# Patient Record
Sex: MALE | Race: OTHER | NOT HISPANIC OR LATINO | Employment: FULL TIME | URBAN - METROPOLITAN AREA
[De-identification: names, ages, dates, MRNs, and addresses within clinical notes are randomized per-mention and may not be internally consistent; named-entity substitution may affect disease eponyms.]

---

## 2020-12-12 ENCOUNTER — TRANSCRIBE ORDERS (OUTPATIENT)
Dept: URGENT CARE | Facility: CLINIC | Age: 60
End: 2020-12-12

## 2020-12-12 ENCOUNTER — APPOINTMENT (OUTPATIENT)
Dept: URGENT CARE | Facility: CLINIC | Age: 60
End: 2020-12-12

## 2020-12-12 DIAGNOSIS — Z00.00 PHYSICAL EXAM: ICD-10-CM

## 2020-12-12 DIAGNOSIS — Z00.00 PHYSICAL EXAM: Primary | ICD-10-CM

## 2020-12-12 PROCEDURE — 86765 RUBEOLA ANTIBODY: CPT | Performed by: FAMILY MEDICINE

## 2020-12-12 PROCEDURE — 86762 RUBELLA ANTIBODY: CPT | Performed by: FAMILY MEDICINE

## 2020-12-12 PROCEDURE — 86735 MUMPS ANTIBODY: CPT | Performed by: FAMILY MEDICINE

## 2020-12-12 PROCEDURE — 86787 VARICELLA-ZOSTER ANTIBODY: CPT | Performed by: FAMILY MEDICINE

## 2020-12-12 PROCEDURE — 86480 TB TEST CELL IMMUN MEASURE: CPT | Performed by: FAMILY MEDICINE

## 2020-12-13 LAB — RUBV IGG SERPL IA-ACNC: >175 IU/ML

## 2020-12-14 LAB — VZV IGG SER IA-ACNC: NORMAL

## 2020-12-15 LAB
GAMMA INTERFERON BACKGROUND BLD IA-ACNC: 0.06 IU/ML
M TB IFN-G BLD-IMP: NEGATIVE
M TB IFN-G CD4+ BCKGRND COR BLD-ACNC: -0.02 IU/ML
M TB IFN-G CD4+ BCKGRND COR BLD-ACNC: 0.02 IU/ML
MEV IGG SER QL: NORMAL
MITOGEN IGNF BCKGRD COR BLD-ACNC: >10 IU/ML
MUV IGG SER QL: NORMAL

## 2020-12-23 ENCOUNTER — IMMUNIZATIONS (OUTPATIENT)
Dept: FAMILY MEDICINE CLINIC | Facility: HOSPITAL | Age: 60
End: 2020-12-23
Payer: COMMERCIAL

## 2020-12-23 DIAGNOSIS — Z23 ENCOUNTER FOR IMMUNIZATION: ICD-10-CM

## 2020-12-23 PROCEDURE — 0011A SARS-COV-2 / COVID-19 MRNA VACCINE (MODERNA) 100 MCG: CPT

## 2020-12-23 PROCEDURE — 91301 SARS-COV-2 / COVID-19 MRNA VACCINE (MODERNA) 100 MCG: CPT

## 2021-01-19 ENCOUNTER — IMMUNIZATIONS (OUTPATIENT)
Dept: FAMILY MEDICINE CLINIC | Facility: HOSPITAL | Age: 61
End: 2021-01-19

## 2021-01-19 DIAGNOSIS — Z23 ENCOUNTER FOR IMMUNIZATION: Primary | ICD-10-CM

## 2021-01-19 PROCEDURE — 91301 SARS-COV-2 / COVID-19 MRNA VACCINE (MODERNA) 100 MCG: CPT

## 2021-01-19 PROCEDURE — 0012A SARS-COV-2 / COVID-19 MRNA VACCINE (MODERNA) 100 MCG: CPT

## 2021-05-07 ENCOUNTER — OFFICE VISIT (OUTPATIENT)
Dept: CARDIOLOGY CLINIC | Facility: CLINIC | Age: 61
End: 2021-05-07
Payer: COMMERCIAL

## 2021-05-07 VITALS
WEIGHT: 171 LBS | DIASTOLIC BLOOD PRESSURE: 80 MMHG | SYSTOLIC BLOOD PRESSURE: 130 MMHG | HEIGHT: 70 IN | TEMPERATURE: 97.7 F | BODY MASS INDEX: 24.48 KG/M2 | HEART RATE: 55 BPM | OXYGEN SATURATION: 99 %

## 2021-05-07 DIAGNOSIS — E78.2 MIXED HYPERLIPIDEMIA: ICD-10-CM

## 2021-05-07 DIAGNOSIS — I25.119 CORONARY ARTERY DISEASE INVOLVING NATIVE CORONARY ARTERY OF NATIVE HEART WITH ANGINA PECTORIS (HCC): ICD-10-CM

## 2021-05-07 DIAGNOSIS — R73.03 PRE-DIABETES: ICD-10-CM

## 2021-05-07 DIAGNOSIS — R06.02 SHORTNESS OF BREATH: ICD-10-CM

## 2021-05-07 DIAGNOSIS — Z82.49 FAMILY HISTORY OF ISCHEMIC HEART DISEASE (IHD): ICD-10-CM

## 2021-05-07 DIAGNOSIS — I10 ESSENTIAL HYPERTENSION: ICD-10-CM

## 2021-05-07 PROBLEM — E11.9 TYPE 2 DIABETES MELLITUS WITHOUT COMPLICATION, WITHOUT LONG-TERM CURRENT USE OF INSULIN (HCC): Status: ACTIVE | Noted: 2019-11-19

## 2021-05-07 PROCEDURE — 3008F BODY MASS INDEX DOCD: CPT | Performed by: INTERNAL MEDICINE

## 2021-05-07 PROCEDURE — 93000 ELECTROCARDIOGRAM COMPLETE: CPT | Performed by: INTERNAL MEDICINE

## 2021-05-07 PROCEDURE — 99244 OFF/OP CNSLTJ NEW/EST MOD 40: CPT | Performed by: INTERNAL MEDICINE

## 2021-05-07 RX ORDER — EZETIMIBE 10 MG/1
10 TABLET ORAL DAILY
COMMUNITY
Start: 2021-04-23

## 2021-05-07 RX ORDER — LISINOPRIL 20 MG/1
20 TABLET ORAL 2 TIMES DAILY
COMMUNITY
Start: 2021-04-23

## 2021-05-07 RX ORDER — MULTIVITAMIN
TABLET ORAL DAILY
COMMUNITY

## 2021-05-07 RX ORDER — AMLODIPINE BESYLATE 2.5 MG/1
2.5 TABLET ORAL 2 TIMES DAILY
COMMUNITY
Start: 2021-04-23

## 2021-05-07 RX ORDER — ASPIRIN 325 MG
325 TABLET ORAL DAILY
COMMUNITY

## 2021-05-07 RX ORDER — ROSUVASTATIN CALCIUM 40 MG/1
40 TABLET, COATED ORAL DAILY
COMMUNITY
Start: 2021-04-23

## 2021-05-07 NOTE — PROGRESS NOTES
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 he has more shortness of breath when he exerts  He had history of coronary artery disease with ectasia of LAD on last catheterization in   His previously EKG stress test was abnormal   There is no point of repeating exercise stress test he will be scheduled for nuclear stress test       2  Coronary artery disease  He had history of coronary artery disease with moderate stenosis of LAD  We do not have the report and films available  I have seen his angiogram in 2012  Continue aspirin continue statins and Zetia  Cholesterol profile is acceptable  goal is LDL less than 70      3  Dyslipidemia  Currently he is on Crestor 40 mg and Zetia 10 mg  He is tolerating it very well  Is scheduled to have repeat blood test his LDL goal is generally 60-70       4  Family history of ischemic heart disease  Family members have history of coronary artery disease  He is well aware of pathophysiology of coronary artery disease  5  Pre diabetes mellitus  HbA1c is around 6  He is on metformin  6  Essential hypertension  Blood pressure is well controlled with amlodipine 2 5 twice a day and lisinopril  Electrolytes are acceptable  Continue same Rx  Continue current Rx    Due to shortness of breath will schedule for echo Doppler as well as nuclear stress test as he has previously abnormal stress EKG test   His previous reports were reviewed  Thank you for your consultation  If you have any question please call me at [phone]    Counseling :  A description of the counseling  Goals and Barriers  Patient's ability to self care: Yes  Medication side effect reviewed with patient in detail and all their questions answered to their satisfaction  Primary Care Physician Requesting Consult: Self referred    Reason for Consult / Principal Problem:  Exertional shortness of breath and history of coronary artery disease  HPI :     Elyssa Amor is a 57y o  year old male who was referred by primary care doctor for  Exertional shortness of breath  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  Has personal history of hyperlipidemia, nonobstructive coronary artery disease by cardiac catheterization in about 2013 at [facility] now noted that he has some exertional shortness of breath  His other risk factors include history of pre diabetes mellitus, dyslipidemia  Previously he used to be on niacin and Crestor currently he is taking Zetia and niacin and cholesterol profile acceptable  He does not smoke  He is generally very fit but 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 particularly when he climbs stairs  No chest pain EKG done shows normal sinus rhythm heart rate 55 beats per minute  Previously he has done exercise stress test which has come abnormal   I have seen his previous angiogram in 2013 who has report is not available it shows he has around 35 40% lad mid stenosis with ectasia of LAD was noted  His last stress EKG test was in 2015  But his 1st EKG stressed in 2008 as well as in 2010 has shown ST depression of 1-2 mm which led to cardiac catheterization  Later on nuclear stress test was nonischemic at that time       He does not smoke     no recent surgery    Review of Systems   Constitutional: Negative for activity change, chills, diaphoresis, fever and unexpected weight change  HENT: Negative for congestion  Eyes: Negative for discharge and redness  Respiratory: Positive for shortness of breath  Negative for cough, chest tightness and wheezing  Exertional   Cardiovascular: Negative  Negative for chest pain, palpitations and leg swelling  Gastrointestinal: Negative for abdominal pain, diarrhea and nausea  Endocrine: Negative  Genitourinary: Negative for decreased urine volume and urgency  Musculoskeletal: Negative  Negative for arthralgias, back pain and gait problem  Skin: Negative for rash and wound  Allergic/Immunologic: Negative  Neurological: Negative for dizziness, seizures, syncope, weakness, light-headedness and headaches  Hematological: Negative  Psychiatric/Behavioral: Negative for agitation and confusion  The patient is not nervous/anxious  Historical Information   Past Medical History:   Diagnosis Date    HTN (hypertension)     Hyperlipidemia     Prediabetes      History reviewed  No pertinent surgical history    Social History     Substance and Sexual Activity   Alcohol Use Yes    Comment: on weekends     Social History     Substance and Sexual Activity   Drug Use Not on file     Social History     Tobacco Use   Smoking Status Never Smoker   Smokeless Tobacco Never Used     Family History:   Family History   Problem Relation Age of Onset    Coronary artery disease Maternal Aunt     Coronary artery disease Maternal Uncle        Meds/Allergies     No Known Allergies    Current Outpatient Medications:     amLODIPine (NORVASC) 2 5 mg tablet, Take 2 5 mg by mouth 2 (two) times a day, Disp: , Rfl:     aspirin 325 mg tablet, Take 325 mg by mouth daily, Disp: , Rfl:     ezetimibe (ZETIA) 10 mg tablet, Take 10 mg by mouth daily, Disp: , Rfl:     lisinopril (ZESTRIL) 20 mg tablet, Take 20 mg by mouth 2 (two) times a day, Disp: , Rfl:     METFORMIN HCL ER PO, Take 1,500 mg by mouth daily, Disp: , Rfl:     Multiple Vitamin (Multi Vitamin Mens) tablet, Take by mouth daily, Disp: , Rfl:     rosuvastatin (CRESTOR) 40 MG tablet, Take 40 mg by mouth daily, Disp: , Rfl:     Vitals: Blood pressure 130/80, pulse 55, temperature 97 7 °F (36 5 °C), height 5' 9 5" (1 765 m), weight 77 6 kg (171 lb), SpO2 99 %  Body mass index is 24 89 kg/m²  Wt Readings from Last 3 Encounters:   05/07/21 77 6 kg (171 lb)     Vitals:    05/07/21 1501   Weight: 77 6 kg (171 lb)     BP Readings from Last 3 Encounters:   05/07/21 130/80         Physical Exam    Neurologic:  Alert & oriented x 3, no new focal deficits, Not in any acute distress,  Constitutional:  Well developed, well nourished, non-toxic appearance   Eyes:  Pupil equal and reacting to light, conjunctiva normal, No JVP, No LNP , no carotid bruit  HENT:  Atraumatic, oropharynx moist, Neck- normal range of motion, no tenderness,  Neck supple   Respiratory:  Bilateral air entry, mostly clear to auscultation  Cardiovascular: S1-S2 regular with  No gallops or murmur  GI:  Soft, nondistended, normal bowel sounds, nontender, no hepatosplenomegaly appreciated  Musculoskeletal:  No edema, no tenderness, no deformities  Skin:  Well hydrated, no rash   Lymphatic:  No lymphadenopathy noted   Extremities:  No edema and distal pulses are present    Diagnostic Studies Review Cardio:     exercise stress test done 12/04/2088 as well as in 2009 shows positive stress EKG test but nuclear stress test shows no ischemia     cardiac catheterization done in 2012 or 2013 shows nonobstructive disease with ectasia of LAD  Nonobstructive disease in other arteries  EKG:    Twelve lead EKG done today 05/07/2021 shows sinus rhythm heart rate 55 beats per minute  No significant ST changes  Dr Suman Kuhn MD [facility] [place]      "This note has been constructed using a voice recognition system  Therefore there may be syntax, spelling, and/or grammatical errors   Please call if you have any questions  "

## 2021-05-12 ENCOUNTER — HOSPITAL ENCOUNTER (OUTPATIENT)
Dept: RADIOLOGY | Facility: HOSPITAL | Age: 61
Discharge: HOME | End: 2021-05-12
Attending: INTERNAL MEDICINE
Payer: COMMERCIAL

## 2021-05-12 ENCOUNTER — HOSPITAL ENCOUNTER (OUTPATIENT)
Dept: NON INVASIVE DIAGNOSTICS | Facility: HOSPITAL | Age: 61
Discharge: HOME | End: 2021-05-12
Attending: INTERNAL MEDICINE
Payer: COMMERCIAL

## 2021-05-12 DIAGNOSIS — I25.119 CORONARY ARTERY DISEASE INVOLVING NATIVE CORONARY ARTERY OF NATIVE HEART WITH ANGINA PECTORIS (HCC): ICD-10-CM

## 2021-05-12 DIAGNOSIS — E78.2 MIXED HYPERLIPIDEMIA: ICD-10-CM

## 2021-05-12 DIAGNOSIS — Z82.49 FAMILY HISTORY OF ISCHEMIC HEART DISEASE (IHD): ICD-10-CM

## 2021-05-12 DIAGNOSIS — R06.02 SHORTNESS OF BREATH: ICD-10-CM

## 2021-05-12 DIAGNOSIS — R73.03 PRE-DIABETES: ICD-10-CM

## 2021-05-12 LAB
CHEST PAIN STATEMENT: NORMAL
MAX DIASTOLIC BP: 90 MMHG
MAX HEART RATE: 166 BPM
MAX PREDICTED HEART RATE: 158 BPM
MAX. SYSTOLIC BP: 190 MMHG
PROTOCOL NAME: NORMAL
REASON FOR TERMINATION: NORMAL
TARGET HR FORMULA: NORMAL
TEST INDICATION: NORMAL
TIME IN EXERCISE PHASE: NORMAL

## 2021-05-12 PROCEDURE — 93306 TTE W/DOPPLER COMPLETE: CPT | Performed by: INTERNAL MEDICINE

## 2021-05-12 PROCEDURE — 93306 TTE W/DOPPLER COMPLETE: CPT

## 2021-05-12 PROCEDURE — G1004 CDSM NDSC: HCPCS

## 2021-05-12 PROCEDURE — A9502 TC99M TETROFOSMIN: HCPCS

## 2021-05-12 PROCEDURE — 93017 CV STRESS TEST TRACING ONLY: CPT

## 2021-05-12 PROCEDURE — 78452 HT MUSCLE IMAGE SPECT MULT: CPT

## 2021-05-13 PROCEDURE — 78452 HT MUSCLE IMAGE SPECT MULT: CPT | Performed by: INTERNAL MEDICINE

## 2021-05-13 PROCEDURE — 93018 CV STRESS TEST I&R ONLY: CPT | Performed by: INTERNAL MEDICINE

## 2021-05-13 PROCEDURE — 93016 CV STRESS TEST SUPVJ ONLY: CPT | Performed by: INTERNAL MEDICINE

## 2021-11-12 ENCOUNTER — IMMUNIZATIONS (OUTPATIENT)
Dept: FAMILY MEDICINE CLINIC | Facility: HOSPITAL | Age: 61
End: 2021-11-12

## 2021-11-12 DIAGNOSIS — Z23 ENCOUNTER FOR IMMUNIZATION: Primary | ICD-10-CM

## 2021-11-12 PROCEDURE — 0013A COVID-19 MODERNA VACC 0.25 ML BOOSTER: CPT

## 2021-11-12 PROCEDURE — 91306 COVID-19 MODERNA VACC 0.25 ML BOOSTER: CPT

## 2022-10-03 ENCOUNTER — PREP FOR PROCEDURE (OUTPATIENT)
Dept: GASTROENTEROLOGY | Facility: CLINIC | Age: 62
End: 2022-10-03

## 2022-10-03 DIAGNOSIS — K21.9 GASTROESOPHAGEAL REFLUX DISEASE WITHOUT ESOPHAGITIS: Primary | ICD-10-CM

## 2022-10-03 DIAGNOSIS — Z12.11 ENCOUNTER FOR SCREENING COLONOSCOPY: ICD-10-CM

## 2022-10-06 ENCOUNTER — ANESTHESIA EVENT (OUTPATIENT)
Dept: ANESTHESIOLOGY | Facility: AMBULATORY SURGERY CENTER | Age: 62
End: 2022-10-06

## 2022-10-06 ENCOUNTER — ANESTHESIA (OUTPATIENT)
Dept: ANESTHESIOLOGY | Facility: AMBULATORY SURGERY CENTER | Age: 62
End: 2022-10-06

## 2022-10-20 ENCOUNTER — HOSPITAL ENCOUNTER (OUTPATIENT)
Dept: GASTROENTEROLOGY | Facility: AMBULATORY SURGERY CENTER | Age: 62
Discharge: HOME | End: 2022-10-20

## 2022-10-20 ENCOUNTER — ANESTHESIA EVENT (OUTPATIENT)
Dept: GASTROENTEROLOGY | Facility: AMBULATORY SURGERY CENTER | Age: 62
End: 2022-10-20

## 2022-10-20 ENCOUNTER — ANESTHESIA (OUTPATIENT)
Dept: GASTROENTEROLOGY | Facility: AMBULATORY SURGERY CENTER | Age: 62
End: 2022-10-20

## 2022-10-20 VITALS
TEMPERATURE: 96.1 F | WEIGHT: 172 LBS | HEIGHT: 70 IN | RESPIRATION RATE: 18 BRPM | HEART RATE: 72 BPM | BODY MASS INDEX: 24.62 KG/M2 | DIASTOLIC BLOOD PRESSURE: 72 MMHG | SYSTOLIC BLOOD PRESSURE: 118 MMHG | OXYGEN SATURATION: 98 %

## 2022-10-20 DIAGNOSIS — K21.9 GASTROESOPHAGEAL REFLUX DISEASE WITHOUT ESOPHAGITIS: ICD-10-CM

## 2022-10-20 DIAGNOSIS — Z12.11 ENCOUNTER FOR SCREENING COLONOSCOPY: ICD-10-CM

## 2022-10-20 DIAGNOSIS — K25.3 ACUTE GASTRIC ULCER WITHOUT HEMORRHAGE OR PERFORATION: Primary | ICD-10-CM

## 2022-10-20 PROCEDURE — 88342 IMHCHEM/IMCYTCHM 1ST ANTB: CPT | Performed by: STUDENT IN AN ORGANIZED HEALTH CARE EDUCATION/TRAINING PROGRAM

## 2022-10-20 PROCEDURE — 88341 IMHCHEM/IMCYTCHM EA ADD ANTB: CPT | Performed by: STUDENT IN AN ORGANIZED HEALTH CARE EDUCATION/TRAINING PROGRAM

## 2022-10-20 PROCEDURE — 88313 SPECIAL STAINS GROUP 2: CPT | Performed by: STUDENT IN AN ORGANIZED HEALTH CARE EDUCATION/TRAINING PROGRAM

## 2022-10-20 PROCEDURE — 88305 TISSUE EXAM BY PATHOLOGIST: CPT | Performed by: STUDENT IN AN ORGANIZED HEALTH CARE EDUCATION/TRAINING PROGRAM

## 2022-10-20 RX ORDER — SODIUM CHLORIDE, SODIUM LACTATE, POTASSIUM CHLORIDE, CALCIUM CHLORIDE 600; 310; 30; 20 MG/100ML; MG/100ML; MG/100ML; MG/100ML
INJECTION, SOLUTION INTRAVENOUS CONTINUOUS PRN
Status: DISCONTINUED | OUTPATIENT
Start: 2022-10-20 | End: 2022-10-20

## 2022-10-20 RX ORDER — PANTOPRAZOLE SODIUM 40 MG/1
40 TABLET, DELAYED RELEASE ORAL DAILY
Qty: 30 TABLET | Refills: 1 | Status: SHIPPED | OUTPATIENT
Start: 2022-10-20

## 2022-10-20 RX ORDER — PROPOFOL 10 MG/ML
INJECTION, EMULSION INTRAVENOUS AS NEEDED
Status: DISCONTINUED | OUTPATIENT
Start: 2022-10-20 | End: 2022-10-20

## 2022-10-20 RX ADMIN — PROPOFOL 150 MG: 10 INJECTION, EMULSION INTRAVENOUS at 07:30

## 2022-10-20 RX ADMIN — PROPOFOL 20 MG: 10 INJECTION, EMULSION INTRAVENOUS at 07:32

## 2022-10-20 RX ADMIN — PROPOFOL 30 MG: 10 INJECTION, EMULSION INTRAVENOUS at 07:40

## 2022-10-20 RX ADMIN — PROPOFOL 20 MG: 10 INJECTION, EMULSION INTRAVENOUS at 07:45

## 2022-10-20 RX ADMIN — PROPOFOL 20 MG: 10 INJECTION, EMULSION INTRAVENOUS at 07:42

## 2022-10-20 RX ADMIN — PROPOFOL 30 MG: 10 INJECTION, EMULSION INTRAVENOUS at 07:34

## 2022-10-20 RX ADMIN — PROPOFOL 20 MG: 10 INJECTION, EMULSION INTRAVENOUS at 07:38

## 2022-10-20 RX ADMIN — SODIUM CHLORIDE, SODIUM LACTATE, POTASSIUM CHLORIDE, CALCIUM CHLORIDE: 600; 310; 30; 20 INJECTION, SOLUTION INTRAVENOUS at 07:27

## 2022-10-20 RX ADMIN — PROPOFOL 20 MG: 10 INJECTION, EMULSION INTRAVENOUS at 07:36

## 2022-10-20 NOTE — ANESTHESIA PREPROCEDURE EVALUATION
Procedure:  COLONOSCOPY  EGD    Relevant Problems   CARDIO   (+) CAD (coronary artery disease), native coronary artery   (+) Hyperlipidemia      ENDO   (+) Type 2 diabetes mellitus without complication, without long-term current use of insulin (HCC)        Physical Exam    Airway    Mallampati score: II  TM Distance: >3 FB  Neck ROM: full     Dental   No notable dental hx     Cardiovascular  Cardiovascular exam normal    Pulmonary  Pulmonary exam normal     Other Findings        Anesthesia Plan  ASA Score- 2     Anesthesia Type- IV sedation with anesthesia with ASA Monitors  Additional Monitors:   Airway Plan:           Plan Factors-Exercise tolerance (METS): >4 METS  Chart reviewed  EKG reviewed  Imaging results reviewed  Existing labs reviewed  Patient summary reviewed  Patient is not a current smoker  Induction-     Postoperative Plan-     Informed Consent- Anesthetic plan and risks discussed with patient  I personally reviewed this patient with the CRNA  Discussed and agreed on the Anesthesia Plan with the CRNA  Madhuri Cool

## 2022-10-20 NOTE — H&P
History and Physical -  [facility]  Jesus Gonzalez 60 y o  male MRN: [id number]                  HPI: Jesus Gonzalez is a 60y o  year old male who presents for for GERD and screening colonoscopy, last colonoscopy on 2012      REVIEW OF SYSTEMS: Per the HPI, and otherwise unremarkable  Historical Information   Past Medical History:   Diagnosis Date   • Diabetes mellitus ([address] )     pre dioabetic   • GERD (gastroesophageal reflux disease)    • HTN (hypertension)    • Hyperlipidemia    • Prediabetes      Past Surgical History:   Procedure Laterality Date   • COLONOSCOPY     • UPPER GASTROINTESTINAL ENDOSCOPY       Social History   Social History     Substance and Sexual Activity   Alcohol Use Yes    Comment: on weekends     Social History     Substance and Sexual Activity   Drug Use Never     Social History     Tobacco Use   Smoking Status Never Smoker   Smokeless Tobacco Never Used     Family History   Problem Relation Age of Onset   • Coronary artery disease Maternal Aunt    • Coronary artery disease Maternal Uncle        Meds/Allergies     (Not in a hospital admission)      No Known Allergies    Objective     /73   Pulse 63   Temp (!) 96 1 °F (35 6 °C) (Skin)   Resp 18   Ht 5' 10" (1 778 m)   Wt 78 kg (172 lb)   SpO2 100%   BMI 24 68 kg/m²       PHYSICAL EXAM    Gen: NAD  CV: RRR  CHEST: Clear  ABD: soft, NT/ND  EXT: no edema      ASSESSMENT/PLAN:  This is a 60y o  year old male here for EGD and colonoscopy, and he is stable and optimized for his procedure 
no strength deficits were identified

## 2022-10-26 NOTE — RESULT ENCOUNTER NOTE
I called and notified Dr Kelsey Sepulveda of Colonoscopy results  Placed patient on 10 year colon recall

## 2023-07-24 ENCOUNTER — TELEPHONE (OUTPATIENT)
Dept: CARDIOLOGY CLINIC | Facility: CLINIC | Age: 63
End: 2023-07-24

## 2023-07-24 ENCOUNTER — OFFICE VISIT (OUTPATIENT)
Dept: CARDIOLOGY CLINIC | Facility: CLINIC | Age: 63
End: 2023-07-24
Payer: COMMERCIAL

## 2023-07-24 ENCOUNTER — APPOINTMENT (OUTPATIENT)
Dept: LAB | Facility: HOSPITAL | Age: 63
End: 2023-07-24
Attending: INTERNAL MEDICINE
Payer: COMMERCIAL

## 2023-07-24 VITALS
HEIGHT: 70 IN | HEART RATE: 65 BPM | SYSTOLIC BLOOD PRESSURE: 138 MMHG | DIASTOLIC BLOOD PRESSURE: 80 MMHG | BODY MASS INDEX: 24.34 KG/M2 | OXYGEN SATURATION: 98 % | WEIGHT: 170 LBS

## 2023-07-24 DIAGNOSIS — R06.09 DYSPNEA ON EXERTION: ICD-10-CM

## 2023-07-24 DIAGNOSIS — R94.39 ABNORMAL STRESS TEST: ICD-10-CM

## 2023-07-24 DIAGNOSIS — R73.01 IMPAIRED FASTING BLOOD SUGAR: ICD-10-CM

## 2023-07-24 DIAGNOSIS — E78.2 MIXED HYPERLIPIDEMIA: ICD-10-CM

## 2023-07-24 DIAGNOSIS — R07.89 OTHER CHEST PAIN: ICD-10-CM

## 2023-07-24 DIAGNOSIS — Z82.49 FAMILY HISTORY OF ISCHEMIC HEART DISEASE (IHD): ICD-10-CM

## 2023-07-24 LAB — CARDIAC TROPONIN I PNL SERPL HS: 2 NG/L (ref 8–18)

## 2023-07-24 PROCEDURE — 99214 OFFICE O/P EST MOD 30 MIN: CPT | Performed by: INTERNAL MEDICINE

## 2023-07-24 PROCEDURE — 93000 ELECTROCARDIOGRAM COMPLETE: CPT | Performed by: INTERNAL MEDICINE

## 2023-07-24 PROCEDURE — 84484 ASSAY OF TROPONIN QUANT: CPT

## 2023-07-24 PROCEDURE — 36415 COLL VENOUS BLD VENIPUNCTURE: CPT

## 2023-07-24 RX ORDER — METFORMIN HYDROCHLORIDE 500 MG/1
1500 TABLET, EXTENDED RELEASE ORAL
COMMUNITY
Start: 2023-07-06

## 2023-07-24 NOTE — TELEPHONE ENCOUNTER
Patient has [facility] and is scheduled for nuclear stress test tomorrow 7/25/23.  Ordered by Dr. Nayeli Arteaga

## 2023-07-24 NOTE — PROGRESS NOTES
Progress note- Cardiology Office  [facility]. Yaneli Kruegertal 58 y.o. male MRN: [id number]  : 1960  Unit/Bed#:  Encounter: [id number]      Assessment:     1. Dyspnea on exertion    2. Other chest pain    3. Family history of ischemic heart disease (IHD)    4. Mixed hyperlipidemia    5. Abnormal stress test    6. Impaired fasting blood sugar        Discussion summary and Plan:      1. Exertional shortness of breath. Patient is having exertional shortness of breath. 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 He had history of coronary artery disease with ectasia of LAD on last catheterization in . His previous EKG test was abnormal.  He has recurrent of symptoms. EKG shows ST-T inferior leads he will be scheduled for nuclear stress test we will also check troponin stat. 2. Coronary artery disease. He had history of coronary artery disease with moderate stenosis of LAD. We do not have the report and films available. I have seen his angiogram in 2012. Continue aspirin continue statins and Zetia. Cholesterol profile is acceptable. goal is LDL less than 70.   Current Outpatient Medications:   •  amLODIPine (NORVASC) 2.5 mg tablet, Take 2.5 mg by mouth 2 (two) times a day, Disp: , Rfl:   •  aspirin 325 mg tablet, Take 325 mg by mouth daily, Disp: , Rfl:   •  ezetimibe (ZETIA) 10 mg tablet, Take 10 mg by mouth daily, Disp: , Rfl:   •  lisinopril (ZESTRIL) 20 mg tablet, Take 20 mg by mouth 2 (two) times a day, Disp: , Rfl:   •  metFORMIN (GLUCOPHAGE-XR) 500 mg 24 hr tablet, Take 1,500 mg by mouth daily at bedtime, Disp: , Rfl:   •  METFORMIN HCL ER PO, Take 1,500 mg by mouth daily, Disp: , Rfl:   •  Multiple Vitamin (Multi Vitamin Mens) tablet, Take by mouth daily, Disp: , Rfl:   •  rosuvastatin (CRESTOR) 40 MG tablet, Take 40 mg by mouth daily, Disp: , Rfl:   •  pantoprazole (PROTONIX) 40 mg tablet, Take 1 tablet (40 mg total) by mouth daily (Patient not taking: Reported on 7/24/2023), Disp: 30 tablet, Rfl: 1       3. Dyslipidemia. Currently he is on Crestor 40 mg and Zetia 10 mg. He is tolerating it very well. Is scheduled to have repeat blood test his LDL goal is generally 60-70. Rest of profile has been acceptable      4. Family history of ischemic heart disease. Family members have history of coronary artery disease. He is well aware of pathophysiology of coronary artery disease. 5. Pre diabetes mellitus. Severe A1c 5.9.      6. Essential hypertension. Blood pressure is well controlled with amlodipine 2.5 twice a day and lisinopril. Electrolytes are acceptable. Blood pressure borderline today. Continue current Rx. Due to episodes of chest pain, abnormal EKG, previous history of normal exercise stress test and new symptoms he will be scheduled for nuclear stress test urgently and stat troponin will be ordered. Thank you for your consultation. If you have any question please call me at [phone]    Counseling :  A description of the counseling. Goals and Barriers. Patient's ability to self care: Yes  Medication side effect reviewed with patient in detail and all their questions answered to their satisfaction. Primary Care Physician Requesting Consult: Self referred    Reason for Consult / Principal Problem: Exertional shortness of breath and chest pain    HPI :     Kashif García is a 58y.o. year old male who was referred by primary care doctor for  Exertional shortness of breath. 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 Has personal history of hyperlipidemia, nonobstructive coronary artery disease by cardiac catheterization in about 2013 at [facility] now noted that he has some exertional shortness of breath. His other risk factors include history of pre diabetes mellitus, dyslipidemia.   Previously he used to be on niacin and Crestor currently he is taking Zetia and niacin and cholesterol profile acceptable. He does not smoke. 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 No chest pain EKG done shows normal sinus rhythm heart rate 55 beats per minute. Previously he has done exercise stress test which has come abnormal.  I have seen his previous angiogram in 2013 who has report is not available it shows he has around 35 40% lad mid stenosis with ectasia of LAD was noted. His last stress EKG test was in 2015. But his 1st EKG stressed in 2008 as well as in 2010 has shown ST depression of 1-2 mm which led to cardiac catheterization. Later on nuclear stress test was nonischemic at that time. He does not smoke     no recent surgery    7/24/2023. 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 This was second episode since yesterday. Pain was initially on the right side and on the left precordial area. He also felt a little dyspnea on exertion. He had a history of nonobstructive coronary artery disease by cath many years ago. And previous test was minimally abnormal.  His EKG showed ST changes in inferior leads. He is currently not having any chest pain. No nausea no vomiting no other cardiovascular issues. Review of Systems   Constitutional: Negative for activity change, chills, diaphoresis, fever and unexpected weight change. HENT: Negative for congestion. Eyes: Negative for discharge and redness. Respiratory: Positive for shortness of breath. Negative for cough, chest tightness and wheezing. Cardiovascular: Positive for chest pain. Negative for palpitations and leg swelling. Gastrointestinal: Negative for abdominal pain, diarrhea and nausea. Endocrine: Negative. Genitourinary: Negative for decreased urine volume and urgency. Musculoskeletal: Negative.   Negative for arthralgias, back pain and gait problem. Skin: Negative for rash and wound. Allergic/Immunologic: Negative. Neurological: Negative for dizziness, seizures, syncope, weakness, light-headedness and headaches. Hematological: Negative. Psychiatric/Behavioral: Negative for agitation and confusion. The patient is nervous/anxious.         Historical Information   Past Medical History:   Diagnosis Date   • Diabetes mellitus ([address])     pre dioabetic   • GERD (gastroesophageal reflux disease)    • HTN (hypertension)    • Hyperlipidemia    • Prediabetes      Past Surgical History:   Procedure Laterality Date   • COLONOSCOPY     • UPPER GASTROINTESTINAL ENDOSCOPY       Social History     Substance and Sexual Activity   Alcohol Use Yes    Comment: on weekends     Social History     Substance and Sexual Activity   Drug Use Never     Social History     Tobacco Use   Smoking Status Never   Smokeless Tobacco Never     Family History:   Family History   Problem Relation Age of Onset   • Coronary artery disease Maternal Aunt    • Coronary artery disease Maternal Uncle        Meds/Allergies     No Known Allergies    Current Outpatient Medications:   •  amLODIPine (NORVASC) 2.5 mg tablet, Take 2.5 mg by mouth 2 (two) times a day, Disp: , Rfl:   •  aspirin 325 mg tablet, Take 325 mg by mouth daily, Disp: , Rfl:   •  ezetimibe (ZETIA) 10 mg tablet, Take 10 mg by mouth daily, Disp: , Rfl:   •  lisinopril (ZESTRIL) 20 mg tablet, Take 20 mg by mouth 2 (two) times a day, Disp: , Rfl:   •  metFORMIN (GLUCOPHAGE-XR) 500 mg 24 hr tablet, Take 1,500 mg by mouth daily at bedtime, Disp: , Rfl:   •  METFORMIN HCL ER PO, Take 1,500 mg by mouth daily, Disp: , Rfl:   •  Multiple Vitamin (Multi Vitamin Mens) tablet, Take by mouth daily, Disp: , Rfl:   •  rosuvastatin (CRESTOR) 40 MG tablet, Take 40 mg by mouth daily, Disp: , Rfl:   •  pantoprazole (PROTONIX) 40 mg tablet, Take 1 tablet (40 mg total) by mouth daily (Patient not taking: Reported on 7/24/2023), Disp: 30 tablet, Rfl: 1    Vitals: Blood pressure 138/80, pulse 65, height 5' 10" (1.778 m), weight 77.1 kg (170 lb), SpO2 98 %. ?  Body mass index is 24.39 kg/m². Wt Readings from Last 3 Encounters:   07/24/23 77.1 kg (170 lb)   10/20/22 78 kg (172 lb)   05/07/21 77.6 kg (171 lb)     Vitals:    07/24/23 1428   Weight: 77.1 kg (170 lb)     BP Readings from Last 3 Encounters:   07/24/23 138/80   10/20/22 118/72   05/07/21 130/80         Physical Exam  Constitutional:       General: He is not in acute distress. Appearance: He is well-developed. He is not diaphoretic. Neck:      Thyroid: No thyromegaly. Vascular: No JVD. Cardiovascular:      Rate and Rhythm: Normal rate and regular rhythm. Pulses: Normal pulses. Heart sounds: Normal heart sounds. Pulmonary:      Effort: Pulmonary effort is normal. No respiratory distress. Breath sounds: Normal breath sounds. No wheezing or rales. Abdominal:      General: There is no distension. Palpations: Abdomen is soft. Tenderness: There is no abdominal tenderness. There is no rebound. Musculoskeletal:         General: No tenderness. Normal range of motion. Cervical back: Normal range of motion and neck supple. Skin:     General: Skin is warm and dry. Neurological:      Mental Status: He is alert and oriented to person, place, and time. Psychiatric:         Behavior: Behavior normal.         Judgment: Judgment normal.           Diagnostic Studies Review Cardio:     exercise stress test done 12/04/2088 as well as in 2009 shows positive stress EKG test but nuclear stress test shows no ischemia     cardiac catheterization done in 2012 or 2013 shows nonobstructive disease with ectasia of LAD. Nonobstructive disease in other arteries. EKG:    Twelve lead EKG done today 05/07/2021 shows sinus rhythm heart rate 55 beats per minute. No significant ST changes.     Twelve-lead EKG done on 7/24/2023 shows normal sinus some ST abnormality in inferior lateral leads cannot rule out ischemia. Dr. Nathalie Doyle MD [facility] [place]      "This note has been constructed using a voice recognition system. Therefore there may be syntax, spelling, and/or grammatical errors.  Please call if you have any questions. "

## 2023-07-25 ENCOUNTER — HOSPITAL ENCOUNTER (OUTPATIENT)
Dept: NON INVASIVE DIAGNOSTICS | Facility: HOSPITAL | Age: 63
Discharge: HOME | End: 2023-07-25

## 2023-07-27 ENCOUNTER — HOSPITAL ENCOUNTER (OUTPATIENT)
Dept: RADIOLOGY | Facility: HOSPITAL | Age: 63
Discharge: HOME | End: 2023-07-27
Payer: COMMERCIAL

## 2023-07-27 ENCOUNTER — HOSPITAL ENCOUNTER (OUTPATIENT)
Dept: NON INVASIVE DIAGNOSTICS | Facility: HOSPITAL | Age: 63
Discharge: HOME | End: 2023-07-27
Payer: COMMERCIAL

## 2023-07-27 ENCOUNTER — HOSPITAL ENCOUNTER (OUTPATIENT)
Dept: RADIOLOGY | Facility: HOSPITAL | Age: 63
Discharge: HOME | End: 2023-07-27

## 2023-07-27 DIAGNOSIS — Z82.49 FAMILY HISTORY OF ISCHEMIC HEART DISEASE (IHD): ICD-10-CM

## 2023-07-27 DIAGNOSIS — R06.09 DYSPNEA ON EXERTION: ICD-10-CM

## 2023-07-27 DIAGNOSIS — E78.2 MIXED HYPERLIPIDEMIA: ICD-10-CM

## 2023-07-27 DIAGNOSIS — R94.39 ABNORMAL STRESS TEST: ICD-10-CM

## 2023-07-27 DIAGNOSIS — R73.01 IMPAIRED FASTING BLOOD SUGAR: ICD-10-CM

## 2023-07-27 DIAGNOSIS — R07.89 OTHER CHEST PAIN: ICD-10-CM

## 2023-07-27 LAB
CHEST PAIN STATEMENT: NORMAL
MAX DIASTOLIC BP: 80 MMHG
MAX HEART RATE: 148 BPM
MAX HR PERCENT: 94 %
MAX HR: 148 BPM
MAX PREDICTED HEART RATE: 156 BPM
MAX. SYSTOLIC BP: 160 MMHG
PROTOCOL NAME: NORMAL
RATE PRESSURE PRODUCT: NORMAL
REASON FOR TERMINATION: NORMAL
SL CV REST NUCLEAR ISOTOPE DOSE: 10.92 MCI
SL CV STRESS NUCLEAR ISOTOPE DOSE: 33 MCI
SL CV STRESS RECOVERY BP: NORMAL MMHG
SL CV STRESS RECOVERY HR: 82 BPM
SL CV STRESS RECOVERY O2 SAT: 100 %
STRESS ANGINA INDEX: 0
STRESS BASELINE BP: NORMAL MMHG
STRESS BASELINE HR: 73 BPM
STRESS O2 SAT REST: 99 %
STRESS PEAK HR: 148 BPM
STRESS POST ESTIMATED WORKLOAD: 10.1 METS
STRESS POST EXERCISE DUR MIN: 8 MIN
STRESS POST EXERCISE DUR SEC: 28 SEC
STRESS POST O2 SAT PEAK: 98 %
STRESS POST PEAK BP: 160 MMHG
TARGET HR FORMULA: NORMAL
TEST INDICATION: NORMAL
TIME IN EXERCISE PHASE: NORMAL

## 2023-07-27 PROCEDURE — 93018 CV STRESS TEST I&R ONLY: CPT | Performed by: INTERNAL MEDICINE

## 2023-07-27 PROCEDURE — 78452 HT MUSCLE IMAGE SPECT MULT: CPT | Performed by: INTERNAL MEDICINE

## 2023-07-27 PROCEDURE — 93017 CV STRESS TEST TRACING ONLY: CPT

## 2023-07-27 PROCEDURE — 93016 CV STRESS TEST SUPVJ ONLY: CPT | Performed by: INTERNAL MEDICINE

## 2023-07-27 PROCEDURE — 78452 HT MUSCLE IMAGE SPECT MULT: CPT

## 2023-07-27 PROCEDURE — G1004 CDSM NDSC: HCPCS

## 2023-07-27 PROCEDURE — A9502 TC99M TETROFOSMIN: HCPCS

## 2023-07-29 DIAGNOSIS — R07.89 OTHER CHEST PAIN: ICD-10-CM

## 2023-07-29 DIAGNOSIS — R06.09 DYSPNEA ON EXERTION: Primary | ICD-10-CM

## 2023-07-29 DIAGNOSIS — I25.119 CORONARY ARTERY DISEASE INVOLVING NATIVE CORONARY ARTERY OF NATIVE HEART WITH ANGINA PECTORIS (HCC): ICD-10-CM

## 2023-07-29 DIAGNOSIS — Z82.49 FAMILY HISTORY OF ISCHEMIC HEART DISEASE (IHD): ICD-10-CM

## 2023-07-29 NOTE — PROGRESS NOTES
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 Not present all the time with activity but patient has history of coronary artery disease with cardiac catheterization done in 2013 shows moderate plaque in LAD and ectasia. Recently done stress test also shows EKG changes with ST depression in multiple leads though he has no significant perfusion abnormality. Due to presence of recurrent symptoms, abnormal EKG, family history of ischemic heart disease, history of dyslipidemia and previous to coronary artery disease he will be scheduled urgently for cardiac catheterization. I reviewed his previous cardiac cath report.   It was done in [facility].  He was told if he has recurrent symptoms associated with shortness of breath or he continues to have symptoms he should go to the hospital.  He agrees with the plan urgent CTA ordered

## 2023-08-01 ENCOUNTER — TELEPHONE (OUTPATIENT)
Dept: CARDIOLOGY CLINIC | Facility: CLINIC | Age: 63
End: 2023-08-01

## 2023-08-01 DIAGNOSIS — R06.09 DYSPNEA ON EXERTION: Primary | ICD-10-CM

## 2023-08-01 DIAGNOSIS — I25.119 CORONARY ARTERY DISEASE INVOLVING NATIVE CORONARY ARTERY OF NATIVE HEART WITH ANGINA PECTORIS (HCC): ICD-10-CM

## 2023-08-01 DIAGNOSIS — R07.89 OTHER CHEST PAIN: ICD-10-CM

## 2023-08-01 LAB
CREAT ?TM UR-SCNC: 92 UMOL/L
EXT ALBUMIN URINE RANDOM: 0.3
HBA1C MFR BLD HPLC: 5.8 %
MICROALBUMIN/CREAT UR: 3 MG/G{CREAT}

## 2023-08-01 NOTE — TELEPHONE ENCOUNTER
Kassandra from [facility] called can you please order a BMP for his CTA.  Thank you

## 2023-08-08 ENCOUNTER — HOSPITAL ENCOUNTER (OUTPATIENT)
Dept: CT IMAGING | Facility: HOSPITAL | Age: 63
Discharge: HOME | End: 2023-08-08
Payer: COMMERCIAL

## 2023-08-08 VITALS — HEART RATE: 65 BPM | SYSTOLIC BLOOD PRESSURE: 119 MMHG | RESPIRATION RATE: 20 BRPM | DIASTOLIC BLOOD PRESSURE: 85 MMHG

## 2023-08-08 DIAGNOSIS — Z82.49 FAMILY HISTORY OF ISCHEMIC HEART DISEASE (IHD): ICD-10-CM

## 2023-08-08 DIAGNOSIS — R06.09 DYSPNEA ON EXERTION: ICD-10-CM

## 2023-08-08 DIAGNOSIS — R07.89 OTHER CHEST PAIN: ICD-10-CM

## 2023-08-08 DIAGNOSIS — I25.119 CORONARY ARTERY DISEASE INVOLVING NATIVE CORONARY ARTERY OF NATIVE HEART WITH ANGINA PECTORIS (HCC): ICD-10-CM

## 2023-08-08 PROCEDURE — 75574 CT ANGIO HRT W/3D IMAGE: CPT

## 2023-08-08 PROCEDURE — G1004 CDSM NDSC: HCPCS

## 2023-08-08 RX ORDER — NITROGLYCERIN 0.4 MG/1
0.4 TABLET SUBLINGUAL ONCE
Status: COMPLETED | OUTPATIENT
Start: 2023-08-08 | End: 2023-08-08

## 2023-08-08 RX ORDER — METOPROLOL TARTRATE 5 MG/5ML
5 INJECTION INTRAVENOUS
Status: DISCONTINUED | OUTPATIENT
Start: 2023-08-08 | End: 2023-08-09 | Stop reason: HOSPADM

## 2023-08-08 RX ADMIN — NITROGLYCERIN 0.4 MG: 0.4 TABLET SUBLINGUAL at 10:16

## 2023-08-08 RX ADMIN — IOHEXOL 80 ML: 350 INJECTION, SOLUTION INTRAVENOUS at 10:14

## 2023-08-08 NOTE — NURSING NOTE
Patient arrived for cardiac cta. Pt took 25 mg metoprolol tartrate q 12 hrs per cardiology. Denies pde5 inhibitor use. Tolerated test well. See vitals flowsheet. Encouraged increased fluids remainder of day. Asymptomatic upon departure.

## 2023-08-09 ENCOUNTER — TELEPHONE (OUTPATIENT)
Dept: CARDIOLOGY CLINIC | Facility: CLINIC | Age: 63
End: 2023-08-09

## 2023-08-09 NOTE — TELEPHONE ENCOUNTER
Patient scheduled for the following    [facility] with Possible PCI  Location : [place]   Date : 8/18/23   Time : 830 am arrive by 745   With Dr. Ute Garcia after midnight  Pack an overnight bag for an observation stay if needed  Will need a  to drive back home.        Insurance : [address] [place] [address]

## 2023-08-09 NOTE — PROGRESS NOTES
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 He has some exertional shortness of breath. He has strong family of heart disease. And he has a risk factor in view of that he will be scheduled for cardiac catheterization. All risk benefits were discussed with him. He wishes to proceed. Coronary CTA discussed with him in detail his angiogram from 2013 reviewed.

## 2023-08-10 DIAGNOSIS — K25.3 ACUTE GASTRIC ULCER WITHOUT HEMORRHAGE OR PERFORATION: ICD-10-CM

## 2023-08-10 RX ORDER — PANTOPRAZOLE SODIUM 40 MG/1
40 TABLET, DELAYED RELEASE ORAL
Qty: 60 TABLET | Refills: 1 | Status: SHIPPED | OUTPATIENT
Start: 2023-08-10

## 2023-08-10 NOTE — TELEPHONE ENCOUNTER
He should take his aspirin Arava Counseling:  Patient counseled regarding adverse effects of Arava including but not limited to nausea, vomiting, abnormalities in liver function tests. Patients may develop mouth sores, rash, diarrhea, and abnormalities in blood counts. The patient understands that monitoring is required including LFTs and blood counts.  There is a rare possibility of scarring of the liver and lung problems that can occur when taking methotrexate. Persistent nausea, loss of appetite, pale stools, dark urine, cough, and shortness of breath should be reported immediately. Patient advised to discontinue Arava treatment and consult with a physician prior to attempting conception. The patient will have to undergo a treatment to eliminate Arava from the body prior to conception.

## 2023-08-31 ENCOUNTER — TELEPHONE (OUTPATIENT)
Dept: CARDIOLOGY CLINIC | Facility: CLINIC | Age: 63
End: 2023-08-31

## 2023-08-31 ENCOUNTER — OFFICE VISIT (OUTPATIENT)
Dept: CARDIOLOGY CLINIC | Facility: CLINIC | Age: 63
End: 2023-08-31
Payer: COMMERCIAL

## 2023-08-31 VITALS
WEIGHT: 170.1 LBS | BODY MASS INDEX: 24.41 KG/M2 | DIASTOLIC BLOOD PRESSURE: 72 MMHG | HEART RATE: 60 BPM | SYSTOLIC BLOOD PRESSURE: 115 MMHG

## 2023-08-31 DIAGNOSIS — I25.119 CORONARY ARTERY DISEASE INVOLVING NATIVE CORONARY ARTERY OF NATIVE HEART WITH ANGINA PECTORIS (HCC): ICD-10-CM

## 2023-08-31 DIAGNOSIS — R73.01 IMPAIRED FASTING BLOOD SUGAR: ICD-10-CM

## 2023-08-31 DIAGNOSIS — R06.09 DYSPNEA ON EXERTION: ICD-10-CM

## 2023-08-31 DIAGNOSIS — E78.2 MIXED HYPERLIPIDEMIA: ICD-10-CM

## 2023-08-31 DIAGNOSIS — Z82.49 FAMILY HISTORY OF ISCHEMIC HEART DISEASE (IHD): ICD-10-CM

## 2023-08-31 PROCEDURE — 99214 OFFICE O/P EST MOD 30 MIN: CPT | Performed by: INTERNAL MEDICINE

## 2023-08-31 PROCEDURE — 93000 ELECTROCARDIOGRAM COMPLETE: CPT | Performed by: INTERNAL MEDICINE

## 2023-08-31 RX ORDER — EVOLOCUMAB 420 MG/3.5
420 KIT SUBCUTANEOUS
Qty: 3.5 ML | Refills: 12 | Status: SHIPPED | OUTPATIENT
Start: 2023-08-31

## 2023-08-31 NOTE — TELEPHONE ENCOUNTER
Dr Kristina Rosas requests that an authorization for Repatha be obtained for Dr India Funez.  Thank you.

## 2023-08-31 NOTE — PROGRESS NOTES
Progress note- Cardiology Office  [facility]. Nemo Fleming 58 y.o. male MRN: [id number]  : 1960  Unit/Bed#:  Encounter: [id number]      Assessment:     1. Dyspnea on exertion    2. Coronary artery disease involving native coronary artery of native heart with angina pectoris ([address])    3. Family history of ischemic heart disease (IHD)    4. Mixed hyperlipidemia    5. Impaired fasting blood sugar        Discussion summary and Plan:      1. Exertional shortness of breath. Patient underwent cardiac catheterization found to have plaque in all 3 arteries. He had intermediate to 60% lesion in his mid LAD and 40% in proximal.  He also had plaque in his RCA. iFR was acceptable. Not likely to cause his symptoms he was reassured. 2. Coronary artery disease. Patient's previous cardiac catheterization  and  shows there is a more plaque burden than before. Still have intermediate lesion. His coronary artery disease has progressed in spite of on high intensity statins. He is already on high intensity statin with Zetia. He is on Crestor 40 and Zetia 10 mg LDL is close to 80 we would prefer it below 55. At this time we will add Repatha [id number] mg subcutaneously every 28 days and will consider checking labs after medication is given. 3. Dyslipidemia. Currently he is on Crestor 40 mg and Zetia 10 mg. He is tolerating it very well. Need to be better controlled. Option will level are to start him on Nexlizet for we can consider starting him on Repatha. I believe if he is on Repatha we can discontinue Zetia he can be on statin and Repatha. 4. Family history of ischemic heart disease. Family members have history of coronary artery disease. He is well aware of pathophysiology of coronary artery disease. 5. Pre diabetes mellitus. HbA1c has improved to 5.8.      6. Essential hypertension.   Blood pressure is well controlled with amlodipine 2.5 twice a day and lisinopril. Electrolytes are acceptable. Plan. Cardiac cath site has healed well. Patient's images from 2012 and 2023 cath reviewed. There has been progression of disease in spite of being high intensity statin and along with Zetia 10 mg. LDL is not at goal LDL is around 78. Would prefer it less than 55. We will add Repatha and once Repatha is given may stop Zetia. He agrees with that plan. 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 [facility] He is a preventive cardiologist.  He was recommended to start on [address] along with statin and discontinue Zetia. It need to be approved. I agree we would prefer his LDL to be 55. All options discussed with him. After extensive discussion we decided to start the patient on Repatha. Thank you for your consultation. If you have any question please call me at [phone]    Counseling :  A description of the counseling. Goals and Barriers. Patient's ability to self care: Yes  Medication side effect reviewed with patient in detail and all their questions answered to their satisfaction. Primary Care Physician Requesting Consult: Self referred    Reason for Consult / Principal Problem: Exertional shortness of breath and chest pain    HPI :     Priscilla Monzon is a 58y.o. year old male who was referred by primary care doctor for  Exertional shortness of breath. 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 Has personal history of hyperlipidemia, nonobstructive coronary artery disease by cardiac catheterization in about 2013 at [facility] now noted that he has some exertional shortness of breath. His other risk factors include history of pre diabetes mellitus, dyslipidemia. Previously he used to be on niacin and Crestor currently he is taking Zetia and niacin and cholesterol profile acceptable. He does not smoke.   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 No chest pain EKG done shows normal sinus rhythm heart rate 55 beats per minute. Previously he has done exercise stress test which has come abnormal.  I have seen his previous angiogram in 2013 who has report is not available it shows he has around 35 40% lad mid stenosis with ectasia of LAD was noted. His last stress EKG test was in 2015. But his 1st EKG stressed in 2008 as well as in 2010 has shown ST depression of 1-2 mm which led to cardiac catheterization. Later on nuclear stress test was nonischemic at that time. He does not smoke     no recent surgery    7/24/2023. Dr Alisia Nettles called earlier today he had an episode of chest pain. This was second episode since yesterday. Pain was initially on the right side and on the left precordial area. He also felt a little dyspnea on exertion. He had a history of nonobstructive coronary artery disease by cath many years ago. And previous test was minimally abnormal.  His EKG showed ST changes in inferior leads. He is currently not having any chest pain. No nausea no vomiting no other cardiovascular issues. 8/31/2023. Above reviewed. 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  Images and cardiac catheterization it shows patient has intermediate lesion in the mid LAD with diffuse plaque in proximal and mid LAD as well as RCA. His LDL is still 78 on 8/21/2023. And his fasting blood sugar is 5.8. Was recommended by his second opinion cardiologist with his LDL goal should be 55 he is already on high intensity statin with Crestor 40 mg and Zetia. Blood pressure has been acceptable. He also has been started on metformin. He is pretty compliant with diet and medications. We would prefer his LDL goal to be less than 55. His weight is in his range. He otherwise feels well.     Review of Systems   Constitutional: Negative for activity change, chills, diaphoresis, fever and unexpected weight change. HENT: Negative for congestion. Eyes: Negative for discharge and redness. Respiratory: Negative for cough, chest tightness, shortness of breath and wheezing. Cardiovascular: Negative. Negative for chest pain, palpitations and leg swelling. Gastrointestinal: Negative for abdominal pain, diarrhea and nausea. Endocrine: Negative. Genitourinary: Negative for decreased urine volume and urgency. Musculoskeletal: Negative. Negative for arthralgias, back pain and gait problem. Skin: Negative for rash and wound. Allergic/Immunologic: Negative. Neurological: Negative for dizziness, seizures, syncope, weakness, light-headedness and headaches. Hematological: Negative. Psychiatric/Behavioral: Negative for agitation and confusion.        Historical Information   Past Medical History:   Diagnosis Date   • Diabetes mellitus ([address])     pre dioabetic   • GERD (gastroesophageal reflux disease)    • HTN (hypertension)    • Hyperlipidemia    • Prediabetes      Past Surgical History:   Procedure Laterality Date   • COLONOSCOPY     • UPPER GASTROINTESTINAL ENDOSCOPY       Social History     Substance and Sexual Activity   Alcohol Use Yes    Comment: on weekends     Social History     Substance and Sexual Activity   Drug Use Never     Social History     Tobacco Use   Smoking Status Never   Smokeless Tobacco Never     Family History:   Family History   Problem Relation Age of Onset   • Coronary artery disease Maternal Aunt    • Coronary artery disease Maternal Uncle        Meds/Allergies     No Known Allergies    Current Outpatient Medications:   •  amLODIPine (NORVASC) 2.5 mg tablet, Take 2.5 mg by mouth 2 (two) times a day, Disp: , Rfl:   •  aspirin 325 mg tablet, Take 325 mg by mouth daily Taking 81 mg aspirin, Disp: , Rfl:   •  Evolocumab with Infusor (Repatha Pushtronex System) 420 MG/3.5ML SOCT, Inject 3.5 mL (420 mg total) under the skin every 28 days, Disp: 3.5 mL, Rfl: 12  •  ezetimibe (ZETIA) 10 mg tablet, Take 10 mg by mouth daily, Disp: , Rfl:   •  lisinopril (ZESTRIL) 20 mg tablet, Take 20 mg by mouth 2 (two) times a day, Disp: , Rfl:   •  metFORMIN (GLUCOPHAGE-XR) 500 mg 24 hr tablet, Take 1,500 mg by mouth daily at bedtime, Disp: , Rfl:   •  METFORMIN HCL ER PO, Take 1,500 mg by mouth daily, Disp: , Rfl:   •  Multiple Vitamin (Multi Vitamin Mens) tablet, Take by mouth daily, Disp: , Rfl:   •  pantoprazole (PROTONIX) 40 mg tablet, Take 1 tablet (40 mg total) by mouth 2 (two) times a day, Disp: 60 tablet, Rfl: 1  •  rosuvastatin (CRESTOR) 40 MG tablet, Take 40 mg by mouth daily, Disp: , Rfl:   •  metoprolol tartrate (LOPRESSOR) 25 mg tablet, Take 1 tablet (25 mg total) by mouth every 12 (twelve) hours (Patient not taking: Reported on 8/31/2023), Disp: 10 tablet, Rfl: 0    Vitals: Blood pressure 115/72, pulse 60, weight 77.2 kg (170 lb 1.6 oz). ? Body mass index is 24.41 kg/m². Wt Readings from Last 3 Encounters:   08/31/23 77.2 kg (170 lb 1.6 oz)   07/24/23 77.1 kg (170 lb)   10/20/22 78 kg (172 lb)     Vitals:    08/31/23 1636   Weight: 77.2 kg (170 lb 1.6 oz)     BP Readings from Last 3 Encounters:   08/31/23 115/72   08/08/23 119/85   07/24/23 138/80         Physical Exam  Constitutional:       General: He is not in acute distress. Appearance: He is well-developed. He is not diaphoretic. Neck:      Thyroid: No thyromegaly. Vascular: No JVD. Cardiovascular:      Pulses: Normal pulses. Heart sounds: Normal heart sounds. Pulmonary:      Effort: Pulmonary effort is normal. No respiratory distress. Breath sounds: Normal breath sounds. No wheezing or rales. Abdominal:      General: There is no distension. Palpations: Abdomen is soft. Tenderness: There is no abdominal tenderness. There is no rebound. Musculoskeletal:         General: No tenderness.  Normal range of motion. Cervical back: Normal range of motion and neck supple. Skin:     General: Skin is warm and dry. Neurological:      Mental Status: He is alert and oriented to person, place, and time. Psychiatric:         Behavior: Behavior normal.         Judgment: Judgment normal.           Diagnostic Studies Review Cardio:     exercise stress test done 12/04/2088 as well as in 2009 shows positive stress EKG test but nuclear stress test shows no ischemia     cardiac catheterization done in 2012 or 2013 shows nonobstructive disease with ectasia of LAD. Nonobstructive disease in other arteries. EKG:    Twelve lead EKG done today 05/07/2021 shows sinus rhythm heart rate 55 beats per minute. No significant ST changes. Twelve-lead EKG done on 7/24/2023 shows normal sinus some ST abnormality in inferior lateral leads cannot rule out ischemia. Twelve-lead EKG 8/31/2020 shows normal sinus rhythm heart rate 60 bpm no significant abnormality. Blood test done 8/21/2023 shows cholesterol 141 with HDL 49 and LDL is 78. Dr. Leona Mike MD [facility]      "This note has been constructed using a voice recognition system. Therefore there may be syntax, spelling, and/or grammatical errors.  Please call if you have any questions. "

## 2023-10-17 ENCOUNTER — TELEPHONE (OUTPATIENT)
Dept: CARDIOLOGY CLINIC | Facility: CLINIC | Age: 63
End: 2023-10-17

## 2023-10-17 NOTE — TELEPHONE ENCOUNTER
Pa for repatha initiated on cover my meds, will await determination. Adam Platt (KeyNorrine Eduardo)  Rx #: [id number]  Repatha Pushtronex System 420MG/3.5ML on-body infusor  Form  [employer] Electronic Prior Authorization Form (5823 NCPDP)    Wait for Determination  Please wait for [employer] 2017 to return a determination.

## 2023-10-19 NOTE — TELEPHONE ENCOUNTER
Chari Koyanagi (Jaciel Decker) - [id number]  Repatha Pushtronex System 420MG/3.5ML on-body infusor  Status: PA Response - ApprovedCreated: October 16th, 2023 [id number]: October 17th, 2023

## 2023-12-08 LAB — HBA1C MFR BLD HPLC: 6 %

## 2024-03-28 DIAGNOSIS — E78.2 MIXED HYPERLIPIDEMIA: ICD-10-CM

## 2024-03-28 DIAGNOSIS — I25.119 CORONARY ARTERY DISEASE INVOLVING NATIVE CORONARY ARTERY OF NATIVE HEART WITH ANGINA PECTORIS (HCC): ICD-10-CM

## 2024-03-28 DIAGNOSIS — Z82.49 FAMILY HISTORY OF ISCHEMIC HEART DISEASE (IHD): ICD-10-CM

## 2024-03-28 RX ORDER — EVOLOCUMAB 420 MG/3.5
420 KIT SUBCUTANEOUS
Qty: 3.5 ML | Refills: 12 | Status: SHIPPED | OUTPATIENT
Start: 2024-03-28

## 2024-05-29 DIAGNOSIS — Z00.6 ENCOUNTER FOR EXAMINATION FOR NORMAL COMPARISON OR CONTROL IN CLINICAL RESEARCH PROGRAM: ICD-10-CM

## 2024-06-07 ENCOUNTER — APPOINTMENT (OUTPATIENT)
Dept: LAB | Facility: CLINIC | Age: 64
End: 2024-06-07

## 2024-06-07 DIAGNOSIS — Z00.6 ENCOUNTER FOR EXAMINATION FOR NORMAL COMPARISON OR CONTROL IN CLINICAL RESEARCH PROGRAM: ICD-10-CM

## 2024-06-07 PROCEDURE — 36415 COLL VENOUS BLD VENIPUNCTURE: CPT

## 2024-07-03 DIAGNOSIS — E78.2 MIXED HYPERLIPIDEMIA: ICD-10-CM

## 2024-07-03 DIAGNOSIS — I25.119 CORONARY ARTERY DISEASE INVOLVING NATIVE CORONARY ARTERY OF NATIVE HEART WITH ANGINA PECTORIS (HCC): ICD-10-CM

## 2024-07-03 DIAGNOSIS — R73.01 IMPAIRED FASTING BLOOD SUGAR: Primary | ICD-10-CM

## 2024-07-03 DIAGNOSIS — Z82.49 FAMILY HISTORY OF ISCHEMIC HEART DISEASE (IHD): ICD-10-CM

## 2024-07-03 RX ORDER — EVOLOCUMAB 140 MG/ML
140 INJECTION, SOLUTION SUBCUTANEOUS
Qty: 6 ML | Refills: 4 | Status: SHIPPED | OUTPATIENT
Start: 2024-07-03

## 2024-10-15 ENCOUNTER — TELEPHONE (OUTPATIENT)
Dept: OTHER | Facility: HOSPITAL | Age: 64
End: 2024-10-15

## 2024-10-18 ENCOUNTER — TELEPHONE (OUTPATIENT)
Dept: OTHER | Facility: HOSPITAL | Age: 64
End: 2024-10-18

## 2024-10-18 DIAGNOSIS — R89.8 ABNORMAL GENETIC TEST: Primary | ICD-10-CM

## 2024-10-18 NOTE — TELEPHONE ENCOUNTER
Marek recently participated in the [employer] study and has a variant associated with FH. He would like a referral to a genetic counselor.

## 2025-01-07 ENCOUNTER — TELEPHONE (OUTPATIENT)
Dept: CARDIOLOGY CLINIC | Facility: CLINIC | Age: 65
End: 2025-01-07

## 2025-01-07 NOTE — TELEPHONE ENCOUNTER
"Hello,    The following message was sent via e-mail to the leadership team:     Please advise if you can help facilitate the following overbook request:    Patient Name: Marek Fleming    Patient MRN: [id number]    Call back #: [phone]    Insurance: [facility]     Department:Cardiology    Speciality: General Cardiology    Reason for overbook request: PATIENT REQUEST    Comments (Write \"N/a\" if no comments):  is one our GI Physicians at [facility] and he was calling in to schedule a  routine follow up with his cardiologist Dr. Paul Ledesma. Unfortunately 1st available was end of AUGUST with Dr. Ledesma, I offered CRNP but Dr. Fleming did not agree. 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   than mentioned he has next week available that he can see Dr. LEDESMA. I advised  I could send a Overbook request although it does not mean he will get a appt. He verbally understood     Requested doctor and location: Paul Ledesma/ [facility]     Date of current appointment: N.A did not want to schedule so far out.       Thank you.      "

## 2025-01-08 NOTE — TELEPHONE ENCOUNTER
PA for Repatha 140 mg/ml SUBMITTED to [facility]    via    []CMM-KEY:   [x]Surescripts-Case ID # [id number]   []Availity-Auth ID # NDC #   []Faxed to plan   []Other website   []Phone call Case ID #     []PA sent as URGENT    All office notes, labs and other pertaining documents and studies sent. Clinical questions answered. Awaiting determination from insurance company.     Turnaround time for your insurance to make a decision on your Prior Authorization can take 7-21 business days.

## 2025-01-14 ENCOUNTER — OFFICE VISIT (OUTPATIENT)
Dept: CARDIOLOGY CLINIC | Facility: CLINIC | Age: 65
End: 2025-01-14
Payer: COMMERCIAL

## 2025-01-14 VITALS
BODY MASS INDEX: 24.91 KG/M2 | HEART RATE: 60 BPM | OXYGEN SATURATION: 98 % | DIASTOLIC BLOOD PRESSURE: 70 MMHG | SYSTOLIC BLOOD PRESSURE: 120 MMHG | WEIGHT: 174 LBS | HEIGHT: 70 IN

## 2025-01-14 DIAGNOSIS — I25.119 CORONARY ARTERY DISEASE INVOLVING NATIVE CORONARY ARTERY OF NATIVE HEART WITH ANGINA PECTORIS (HCC): ICD-10-CM

## 2025-01-14 DIAGNOSIS — E11.9 TYPE 2 DIABETES MELLITUS WITHOUT COMPLICATION, WITHOUT LONG-TERM CURRENT USE OF INSULIN (HCC): ICD-10-CM

## 2025-01-14 DIAGNOSIS — Z82.49 FAMILY HISTORY OF ISCHEMIC HEART DISEASE (IHD): ICD-10-CM

## 2025-01-14 DIAGNOSIS — E78.2 MIXED HYPERLIPIDEMIA: ICD-10-CM

## 2025-01-14 PROCEDURE — 99214 OFFICE O/P EST MOD 30 MIN: CPT | Performed by: INTERNAL MEDICINE

## 2025-01-14 PROCEDURE — 93000 ELECTROCARDIOGRAM COMPLETE: CPT | Performed by: INTERNAL MEDICINE

## 2025-01-14 RX ORDER — EVOLOCUMAB 140 MG/ML
140 INJECTION, SOLUTION SUBCUTANEOUS
Qty: 6 ML | Refills: 4 | Status: SHIPPED | OUTPATIENT
Start: 2025-01-14

## 2025-01-14 NOTE — PROGRESS NOTES
Progress note- Cardiology Office  [facility].    Marek Fleming 64 y.o. male MRN: [id number]  : 1960  Unit/Bed#:  Encounter: [id number]      Assessment:     1. Coronary artery disease involving native coronary artery of native heart with angina pectoris (HCC)    2. Mixed hyperlipidemia    3. Type 2 diabetes mellitus without complication, without long-term current use of insulin (HCC)    4. Family history of ischemic heart disease (IHD)        Discussion summary and Plan:      1. Exertional shortness of breath.  Not much of any problem at this time.  Underwent cardiac aspiration 2023 report is below.  Nonobstructive disease recommended medical therapy.      2. Coronary artery disease.  Patient's previous cardiac catheterization  and  shows there is a more plaque burden than before.  Still have intermediate lesion.  His coronary artery disease has progressed in spite of on high intensity statins.  Putting him on Repatha has really helped him.  Will continue Repatha and check fasting lipids LFTs.     3. Dyslipidemia.  Continue high intensity statins.  Continue Repatha.  Putting him on Repatha has helped with the elevated LDL.  Repatha will be renewed.      4. Family history of ischemic heart disease.   Family members have history of coronary artery disease.  He is well aware of pathophysiology of coronary artery disease.      5. Pre diabetes mellitus.  HbA1c has improved to 5.8.  HbA1c ordered.      6. Essential hypertension.  Patient is well-controlled with current therapy of lisinopril and amlodipine.  Currently taking 2.5 mg daily.  Monitor blood pressure closely.      Plan.    Change in clinical exam or functional capacity.  Cath report reviewed.  Continue Repatha.  Continue current Rx follow-up 6 to 9 months..      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  He was recommended to start on  Repatha along with statin and discontinue Zetia.  It need to be approved.  I agree we would prefer his LDL to be 55.  All options discussed with him.  After extensive discussion we decided to start the patient on Repatha.        Thank you for your consultation.  If you have any question please call me at [phone]    Counseling :  A description of the counseling.  Goals and Barriers.  Patient's ability to self care: Yes  Medication side effect reviewed with patient in detail and all their questions answered to their satisfaction.      Primary Care Physician Requesting Consult: Dr. Maryan Chamberlain MD        HPI :     Marek Fleming is a 64 y.o. year old male who was referred by primary care doctor for  Exertional shortness of breath.  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  Has personal history of hyperlipidemia, nonobstructive coronary artery disease by cardiac catheterization in about 2013 at [facility] now noted that he has some exertional shortness of breath.  His other risk factors include history of pre diabetes mellitus, dyslipidemia.  Previously he used to be on niacin and Crestor currently he is taking Zetia and niacin and cholesterol profile acceptable.      He does not smoke.  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  No chest pain EKG done shows normal sinus rhythm heart rate 55 beats per minute.  Previously he has done exercise stress test which has come abnormal.  I have seen his previous angiogram in 2013 who has report is not available it shows he has around 35 40% lad mid stenosis with ectasia of LAD was noted.      His last stress EKG test was in 2015.  But his 1st EKG stressed in 2008 as well as in 2010 has shown ST depression of 1-2 mm which led to cardiac catheterization.  Later on nuclear stress test was nonischemic at that time.      He does not smoke     no recent surgery    7/24/2023.    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  This was second episode since yesterday.  Pain was initially on the right side and on the left precordial area.  He also felt a little dyspnea on exertion.  He had a history of nonobstructive coronary artery disease by cath many years ago.  And previous test was minimally abnormal.  His EKG showed ST changes in inferior leads.  He is currently not having any chest pain.  No nausea no vomiting no other cardiovascular issues.    8/31/2023.    Above reviewed.  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  Images and cardiac catheterization it shows patient has intermediate lesion in the mid LAD with diffuse plaque in proximal and mid LAD as well as RCA.  His LDL is still 78 on 8/21/2023.  And his fasting blood sugar is 5.8.  Was recommended by his second opinion cardiologist with his LDL goal should be 55 he is already on high intensity statin with Crestor 40 mg and Zetia.  Blood pressure has been acceptable.  He also has been started on metformin.  He is pretty compliant with diet and medications.  We would prefer his LDL goal to be less than 55.  His weight is in his range.  He otherwise feels well.    Review of Systems    Historical Information   Past Medical History:   Diagnosis Date    Diabetes mellitus (HCC)     pre dioabetic    GERD (gastroesophageal reflux disease)     HTN (hypertension)     Hyperlipidemia     Prediabetes      Past Surgical History:   Procedure Laterality Date    COLONOSCOPY      UPPER GASTROINTESTINAL ENDOSCOPY       Social History     Substance and Sexual Activity   Alcohol Use Yes    Comment: on weekends     Social History     Substance and Sexual Activity   Drug Use Never     Social History     Tobacco Use   Smoking Status Never   Smokeless Tobacco Never     Family History:   Family History   Problem Relation Age of Onset  "   Coronary artery disease Maternal Aunt     Coronary artery disease Maternal Uncle        Meds/Allergies     No Known Allergies    Current Outpatient Medications:     amLODIPine (NORVASC) 2.5 mg tablet, Take 2.5 mg by mouth 2 (two) times a day, Disp: , Rfl:     ASPIRIN 81 PO, Take 81 mg by mouth in the morning, Disp: , Rfl:     Evolocumab (Repatha) 140 MG/ML SOSY, Inject 1 mL (140 mg total) under the skin every 14 (fourteen) days, Disp: 6 mL, Rfl: 4    lisinopril (ZESTRIL) 20 mg tablet, Take 20 mg by mouth 2 (two) times a day, Disp: , Rfl:     metFORMIN (GLUCOPHAGE-XR) 500 mg 24 hr tablet, Take 1,500 mg by mouth daily at bedtime, Disp: , Rfl:     Multiple Vitamin (Multi Vitamin Mens) tablet, Take by mouth daily, Disp: , Rfl:     rosuvastatin (CRESTOR) 40 MG tablet, Take 40 mg by mouth daily (Patient taking differently: Take 20 mg by mouth daily), Disp: , Rfl:     METFORMIN HCL ER PO, Take 1,500 mg by mouth daily (Patient not taking: Reported on 1/14/2025), Disp: , Rfl:     metoprolol tartrate (LOPRESSOR) 25 mg tablet, Take 1 tablet (25 mg total) by mouth every 12 (twelve) hours (Patient not taking: Reported on 8/31/2023), Disp: 10 tablet, Rfl: 0    pantoprazole (PROTONIX) 40 mg tablet, Take 1 tablet (40 mg total) by mouth 2 (two) times a day (Patient not taking: Reported on 1/14/2025), Disp: 60 tablet, Rfl: 1    Vitals: Blood pressure 120/70, pulse 60, height 5' 10\" (1.778 m), weight 78.9 kg (174 lb), SpO2 98%.    Body mass index is 24.97 kg/m².  Wt Readings from Last 3 Encounters:   01/14/25 78.9 kg (174 lb)   08/31/23 77.2 kg (170 lb 1.6 oz)   07/24/23 77.1 kg (170 lb)     Vitals:    01/14/25 1302   Weight: 78.9 kg (174 lb)     BP Readings from Last 3 Encounters:   01/14/25 120/70   08/31/23 115/72   08/08/23 119/85         Physical Exam  Constitutional:       General: He is not in acute distress.     Appearance: He is well-developed. He is not diaphoretic.   Neck:      Thyroid: No thyromegaly.      Vascular: No " JVD.      Trachea: No tracheal deviation.   Cardiovascular:      Rate and Rhythm: Normal rate and regular rhythm.      Heart sounds: S1 normal and S2 normal. Heart sounds not distant. Murmur heard.      Systolic (ejection) murmur is present with a grade of 2/6.      No friction rub. No gallop. No S3 or S4 sounds.   Pulmonary:      Effort: Pulmonary effort is normal. No respiratory distress.      Breath sounds: Normal breath sounds. No wheezing or rales.   Chest:      Chest wall: No tenderness.   Abdominal:      General: Bowel sounds are normal. There is no distension.      Palpations: Abdomen is soft.      Tenderness: There is no abdominal tenderness.   Musculoskeletal:         General: No deformity.      Cervical back: Neck supple.   Skin:     General: Skin is warm and dry.      Coloration: Skin is not pale.      Findings: No rash.   Neurological:      Mental Status: He is alert and oriented to person, place, and time.   Psychiatric:         Behavior: Behavior normal.         Judgment: Judgment normal.           Diagnostic Studies Review Cardio:     exercise stress test done 12/04/2088 as well as in 2009 shows positive stress EKG test but nuclear stress test shows no ischemia     cardiac catheterization done in 2012 or 2013 shows nonobstructive disease with ectasia of LAD.  Nonobstructive disease in other arteries.    Cardiac catheterization done in 2023 at [facility] shows patient has nonobstructive coronary artery disease.  FFR was acceptable.  Was recommended medical therapy.  Plaque in RCA actually got improved    3. Ongoing risk factor modification with goal LDL<70 or even 55   Coronary Findings Diagnostic Dominance: Right   Left Main: The vessel was visualized by angiography and is angiographically normal. FLORES flow is 3.   Left Anterior Descending: Mid LAD lesion is 50% stenosed. FLORES flow is 3. A physiologic assessment was performed on the lesion with other method. FFR ANGIO=0.88   Left  "Circumflex: The vessel was visualized by angiography. The vessel exhibits luminal irregularities. FLORES flow is 3.   Right Coronary Artery: The vessel was visualized by angiography. There is mild disease in the vessel. FLORES flow is 3. Mid RCA lesion is 30% stenosed. FLORES flow is 3.     EKG:    Twelve lead EKG done today 05/07/2021 shows sinus rhythm heart rate 55 beats per minute.  No significant ST changes.    Twelve-lead EKG done on 7/24/2023 shows normal sinus some ST abnormality in inferior lateral leads cannot rule out ischemia.    Twelve-lead EKG 8/31/2020 shows normal sinus rhythm heart rate 60 bpm no significant abnormality.    EKG 1/14/2024 normal sinus some nonspecific ST and inferior lead not changed from previous EKG heart rate 60 bpm.      Blood test done 8/21/2023 shows cholesterol 141 with HDL 49 and LDL is 78.    Dr. Paul Clarke MD [facility]      \"This note has been constructed using a voice recognition system.Therefore there may be syntax, spelling, and/or grammatical errors. Please call if you have any questions. \"  "

## 2025-01-14 NOTE — TELEPHONE ENCOUNTER
I received fax from [facility] seeking clinical response.   I filled out what I could, placed papers on Dr Chávez desk for further completion.

## 2025-01-15 NOTE — TELEPHONE ENCOUNTER
Karuna from [employer] calls, following up on fax sent.  Advised her that it is awaiting completion from provider, that it will be faxed once completed.

## 2025-01-16 NOTE — TELEPHONE ENCOUNTER
PA for Repatha 140 mg/ml  APPROVED     Date(s) approved January 8, 2025 to January 8, 2026     Case #[id number]     Patient advised by          [x]MDSavehart Message  []Phone call   []LMOM  []L/M to call office as no active Communication consent on file  [x]Unable to leave detailed message as VM not approved on Communication consent       Pharmacy advised by    [x]Fax  []Phone call    Approval letter scanned into Media No

## 2025-03-05 ENCOUNTER — OFFICE VISIT (OUTPATIENT)
Dept: UROLOGY | Facility: CLINIC | Age: 65
End: 2025-03-05
Payer: COMMERCIAL

## 2025-03-05 VITALS
OXYGEN SATURATION: 97 % | HEIGHT: 70 IN | HEART RATE: 73 BPM | BODY MASS INDEX: 25.14 KG/M2 | WEIGHT: 175.6 LBS | SYSTOLIC BLOOD PRESSURE: 122 MMHG | DIASTOLIC BLOOD PRESSURE: 78 MMHG

## 2025-03-05 DIAGNOSIS — Z12.5 PROSTATE CANCER SCREENING: Primary | ICD-10-CM

## 2025-03-05 PROCEDURE — 99203 OFFICE O/P NEW LOW 30 MIN: CPT | Performed by: UROLOGY

## 2025-03-05 NOTE — PROGRESS NOTES
Referring Physician: Maryan Chamberlain MD  A copy of this note was sent to the referring physician.       Diagnoses and all orders for this visit:    Prostate cancer screening            Assessment and plan:       1.  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  Discussed that if his urinary symptoms worsen we can consider a trial of an alpha-blocker or daily Cialis.  Recommend continued PSA screening with Dr. Chamberlain and return if the PSA were to rise in the future.  Happy to see the patient again as needed in the future if ever needed      Alejandro Chew MD      Chief Complaint     Prostate evaluation      History of Present Illness     Marek Fleming is a 65 y.o. presents for prostate evaluation    Detailed Urologic History     - please refer to HPI    Review of Systems     Review of Systems   Constitutional: Negative for activity change and fatigue.   HENT: Negative for congestion.    Eyes: Negative for visual disturbance.   Respiratory: Negative for shortness of breath and wheezing.    Cardiovascular: Negative for chest pain and leg swelling.   Gastrointestinal: Negative for abdominal pain.   Endocrine: Negative for polyuria.   Genitourinary: Negative for dysuria, flank pain, hematuria and urgency.   Musculoskeletal: Negative for back pain.   Allergic/Immunologic: Negative for immunocompromised state.   Neurological: Negative for dizziness and numbness.   Psychiatric/Behavioral: Negative for dysphoric mood.   All other systems reviewed and are negative.    AUA SYMPTOM SCORE      Flowsheet Row Most Recent Value   AUA SYMPTOM SCORE    How often have you had a sensation of not emptying your bladder completely after you finished urinating? 1   How often have you had to urinate again less than two hours after you finished urinating? 0   How often have you found you stopped and started again several  "times when you urinate? 0   How often have you found it difficult to postpone urination? 1   How often have you had a weak urinary stream? 0   How often have you had to push or strain to begin urination? 0   How many times did you most typically get up to urinate from the time you went to bed at night until the time you got up in the morning? 0   Quality of Life: If you were to spend the rest of your life with your urinary condition just the way it is now, how would you feel about that? --   AUA SYMPTOM SCORE 2              Allergies     No Known Allergies    Physical Exam       Physical Exam  Constitutional:       General: He is not in acute distress.     Appearance: He is well-developed.   HENT:      Head: Normocephalic and atraumatic.   Cardiovascular:      Comments: Negative lower extremity edema  Pulmonary:      Effort: Pulmonary effort is normal.      Breath sounds: Normal breath sounds.   Abdominal:      Palpations: Abdomen is soft.   Musculoskeletal:         General: Normal range of motion.      Cervical back: Normal range of motion.   Skin:     General: Skin is warm.   Neurological:      Mental Status: He is alert and oriented to person, place, and time.   Psychiatric:         Behavior: Behavior normal.           Vital Signs  Vitals:    03/05/25 1606   BP: 122/78   BP Location: Left arm   Patient Position: Sitting   Cuff Size: Large   Pulse: 73   SpO2: 97%   Weight: 79.7 kg (175 lb 9.6 oz)   Height: 5' 10\" (1.778 m)         Current Medications       Current Outpatient Medications:     amLODIPine (NORVASC) 2.5 mg tablet, Take 2.5 mg by mouth 2 (two) times a day, Disp: , Rfl:     ASPIRIN 81 PO, Take 81 mg by mouth in the morning, Disp: , Rfl:     Evolocumab (Repatha) 140 MG/ML SOSY, Inject 1 mL (140 mg total) under the skin every 14 (fourteen) days, Disp: 6 mL, Rfl: 4    lisinopril (ZESTRIL) 20 mg tablet, Take 20 mg by mouth 2 (two) times a day, Disp: , Rfl:     metFORMIN (GLUCOPHAGE-XR) 500 mg 24 hr tablet, " "Take 1,500 mg by mouth daily at bedtime, Disp: , Rfl:     Multiple Vitamin (Multi Vitamin Mens) tablet, Take by mouth daily, Disp: , Rfl:     rosuvastatin (CRESTOR) 40 MG tablet, Take 40 mg by mouth daily (Patient taking differently: Take 20 mg by mouth daily), Disp: , Rfl:     METFORMIN HCL ER PO, Take 1,500 mg by mouth daily (Patient not taking: Reported on 1/14/2025), Disp: , Rfl:     metoprolol tartrate (LOPRESSOR) 25 mg tablet, Take 1 tablet (25 mg total) by mouth every 12 (twelve) hours (Patient not taking: Reported on 8/31/2023), Disp: 10 tablet, Rfl: 0    pantoprazole (PROTONIX) 40 mg tablet, Take 1 tablet (40 mg total) by mouth 2 (two) times a day (Patient not taking: Reported on 1/14/2025), Disp: 60 tablet, Rfl: 1      Active Problems     Patient Active Problem List   Diagnosis    CAD (coronary artery disease), native coronary artery    Family history of ischemic heart disease (IHD)    Hyperlipidemia    Type 2 diabetes mellitus without complication, without long-term current use of insulin (HCC)    Impaired fasting blood sugar    Abnormal stress test    Prostate cancer screening         Past Medical History     Past Medical History:   Diagnosis Date    Diabetes mellitus (HCC)     pre dioabetic    GERD (gastroesophageal reflux disease)     HTN (hypertension)     Hyperlipidemia     Prediabetes          Surgical History     Past Surgical History:   Procedure Laterality Date    COLONOSCOPY      UPPER GASTROINTESTINAL ENDOSCOPY           Family History     Family History   Problem Relation Age of Onset    Coronary artery disease Maternal Aunt     Coronary artery disease Maternal Uncle          Social History     Social History     Social History     Tobacco Use   Smoking Status Never   Smokeless Tobacco Never         Pertinent Lab Values     No results found for: \"CREATININE\"    No results found for: \"PSA\"    @RESULTRCNT(1H])@      Pertinent Imaging       No results found.      Portions of the record may have " "been created with voice recognition software.  Occasional wrong word or \"sound a like\" substitutions may have occurred due to the inherent limitations of voice recognition software.  In addition some of the content generated from this outpatient encounter includes information designed for patient education and/or communication back to the referring provider.  Read the chart carefully and recognize, using context, where substitutions have occurred.    "

## 2025-03-14 ENCOUNTER — TELEPHONE (OUTPATIENT)
Dept: GENETICS | Facility: CLINIC | Age: 65
End: 2025-03-14

## 2025-03-14 NOTE — TELEPHONE ENCOUNTER
Lvm to follow-up with their genetic counseling referral for their helix test results. We had received a genetic counseling referral for them so we wanted to check-in to see if they had any questions/concerns or if they still wanted to schedule a genetic counseling appointment.     I left my contact information and I will try to call one more time in a few days if we do not hear back.

## 2025-03-19 NOTE — TELEPHONE ENCOUNTER
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 I emphasized that he can ignore the virtual appointment reminders as he will be getting a phone call at the time of the appt.

## 2025-03-25 DIAGNOSIS — E78.01: Primary | ICD-10-CM

## 2025-03-25 NOTE — PROGRESS NOTES
"[facility] Post-Test Genetic Counseling Note    Patient Name: Marek Fleming   /Age: 1960/65 y.o.    Date of Service: 3/25/2025  Genetic Counselor: Alecia Yepez  Interpretation Services: None  Location: Telephone consult   Length of Visit:  12 minutes    Marek presents to the Genetics department to discuss his [facility] genetic test result.    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.?), (rare pathogenic variant)     Medical and Surgical History  Pertinent surgical history:   Past Surgical History:   Procedure Laterality Date    COLONOSCOPY      UPPER GASTROINTESTINAL ENDOSCOPY        Pertinent medical history:  Past Medical History:   Diagnosis Date    Diabetes mellitus (HCC)     pre dioabetic    GERD (gastroesophageal reflux disease)     HTN (hypertension)     Hyperlipidemia     Prediabetes          LDL Cholesterol level (2023): 78 mg/dL     Other History:  Height:   Ht Readings from Last 1 Encounters:   25 5' 10\" (1.778 m)     Weight:   Wt Readings from Last 1 Encounters:   25 79.7 kg (175 lb 9.6 oz)       Relevant Cardiac Personal and Family History*   Patient reports known history of elevated lipids which were reduced with statins and Repatha.  He also reports a family history of high cholesterol and CAD (his brothers).  Patient is already very familiar with hyperlipidemia, and is a physician in the network (Gastroenterologist).    *All history is reported as provided by the patient; records are not available for review, except where indicated.     Assessment:  Familial Hypercholesterolemia (FH) is a genetic condition that leads to atherosclerotic plaque deposition in the coronary arteries at young ages.   Levels of low-density-lipoprotein cholesterol (LDL-C) progressively worsen over time. If left " untreated, it can increase the risk for coronary artery disease, myocardial infarction, xanthomas, and corneal arcus.     Coronary Artery Disease (CAD)   Among individuals with an LDL-C >190 mg/dL (>4.9 mmol/L) and a pathogenic variant in an FH-causing gene, there is a 22-fold increased risk for coronary artery disease compared to a 6-fold increase in the general population with the same LDL-C values (PMID: 02447807).     Myocardial infarction  Studies have found that approximately one in ten individuals with a history of early-onset myocardial infarction have FH, and this risk increased in the setting of a family history of CAD (PMID: 14098986).     Xanthomas   Xanthomas are cholesterol deposits in the tendons of the body due to extremely high levels of LDL-C. They can manifest in the elbow, hands knees, and feet- particularly the Achilles tendon    Some studies identified xanthomas in 30-50% of individuals with FH (PMID: 96985541, 5196217). However, more recent studies showed a lower incidence, likely due to the widespread use of statins (PMID: 89939959).    Corneal arcus   Corneal arcus is a white, gray or blue opaque ring in the corneal margin of the eye. It is caused by abnormal deposition of lipids in the cornea secondary to long-term exposure of elevated LDL-C levels (PMID: 4182838, 27809188, 15799659, 28898513).    Studies have found corneal arcus in 7-30% of individuals with FH (PMID: 97385557, 52760301)  This feature does not resolve with treatment.     Stroke  Recent studies suggest that heterozygous pathogenic variants in FH-causing genes do not confer a significantly increased risk for ischemic stroke (PMID: 42604621, 66463764, 7627871).     Population Frequency  The incidence of hereditary familial hypercholesterolemia in the general population is estimated to be 1/250 (PMID: 60682975,19675316). However, it is estimated to be more common among the [place] citizen [place], [place] Zoroastrian, Swedish, and South   Afrikaner populations (PMID: 70289587, 95707824, 7608987)    Reproductive Risks   If an individual and their partner both have at least one pathogenic variant in an FH-causing gene, there is a chance that a child would inherit a pathogenic variant in two FH-causing genes.  Individuals with pathogenic variants in two FH-causing genes have a much more severe presentation of the condition.     For individuals of reproductive age, we recommend that their partners consider FH genetic testing for reproductive purposes. Preconception genetic counseling (through [facility]) is available to review this recommendation and other options.    Risk and Testing for Family Members   We encouraged Marek to discuss this information with his relatives.     There is a 50% chance that all first-degree relatives (parents, siblings, children) inherited the same pathogenic variant. We discussed that his brothers may also have the same variant which could explain their high cholesterol. A family letter can be generated in Marek's chart to give to relatives which he declined at this time.    Early diagnosis and intervention for relatives at risk for FH may reduce morbidity and mortality (PMID: 15111188, 15171119).     Surveillance recommendations for individuals with this condition begin in childhood, typically between the ages of 5-11 (PMID: 17977702, 38037921). Of note, screening recommendations may begin as early as 1 y/o among children with major CAD risk factors (diabetes mellitus, obesity), family history early-onset CAD (<44 y/o in men; <54 y/o in women), or a first-degree relative with a total cholesterol >240 mg/dL (PMID: 58108257, 34009047). Thus, individuals younger than 18 may consider genetic testing (PMID: 29356916).    Adult relatives should discuss their genetic testing options with their PCP. We recommend that . [facility] PCP's order the [facility] Test 077737 (GeneSeq®: Cardio - Familial  Hypercholesterolemia Panel) for relatives in the network who have not been tested through [facility].      Management  Surveillance Recommendations for Adults  Measurement of lipid levels (TC, LDL-C, HDL-C, triglycerides, lipoprotein)  Interval of follow-up testing TBD by care team based on the presence of other risk factors   Evaluate for concurrent illness (kidney disease, obstructive liver disease, acute myocardial infarction, hypothyroidism) that can affect lipid values.   Consider noninvasive imaging modalities (including echocardiogram, CT angiogram, etc) to inform treatment decision (PMID: 02518447)  Identify modifiable risk factors (smoking, sedentary behavior, hypertension, diabetes, obesity)    Agents and Circumstances to Avoid  Smoking  High intake saturated and trans unsaturated fat  Sedentary lifestyle  Obesity  Hypertension  Diabetes Mellitus     Treatment of Manifestations in Adults with FH  Management guidelines for individuals with FH have been developed by the International Lipid Foundation, American Heart Association and National Lipid Association (PMID: 96194562, 60169740, 14150508). The recommendations listed below are specific to [facility].     These recommendations are subject to change over time and the newest guidelines should be referenced for the most up to date information.     Hyperlipidemia   Regular physical activity, weight control (if indicated), and healthy diet   -Recommended soluble fiber intake between 10-20g/day and reduced saturated/trans unsaturated fat  -LDL-C levels are typically unable to be lowered with lifestyle interventions alone.   Statin therapy to reduce elevated LDL-C levels   -Response to treatment, including assessment of liver enzymes, should be assessed one to three months after the start of therapy and periodically thereafter (PMID: 92606973)  Consider referral to lipid specialist with expertise in FH if there is difficulty reducing elevated LDL-C  levels.  -Additional treatments such as ezetimibe, bile acid sequestrants, PCSK9 inhibitors, or bempedoic acid may be recommended  -More aggressive treatment may be required among individuals with risk factors such as CAD, diabetes mellitus, smoking, metabolic syndrome, and/or family history of early-onset CAD.     Cardiovascular Disease   Measure serum lipoprotein   Consider low-dose aspirin in those with a history of or high-risk for CAD and/or stroke.  Treatment for diabetes mellitus and hypertension   Consider non-invasive imaging modalities in adults   -There is insufficient research regarding the best way to incorporate subclinical atherosclerosis imaging and exercise stress testing into clinical practice (PMID: 08544951, 15998159)    Additional Genetic Testing Recommendations  [facility] is a research-based population genetic screening initiative. It is not a comprehensive genetic test. It screens for the CDC tier 1 genetic conditions (Urlich syndrome, hereditary breast and ovarian cancer syndrome, and FH) only. Patients may be eligible for additional genetic testing for other genetic conditions based on their personal or family history.     We do not have additional genetic testing recommendations for Marek Fleming.    Positive Result: Marek was encouraged to discuss these genetic test results with his PCP.  His cardiologist is already aware and the names of the lipid specialists in the network (Dr. Megan Swan, Dr. Clarke Sullivan, and Dr. Sabina Chino were provided to him).

## 2025-03-28 LAB — HBA1C MFR BLD HPLC: 6.1 %

## 2025-04-04 DIAGNOSIS — E78.2 MIXED HYPERLIPIDEMIA: Primary | ICD-10-CM

## 2025-04-04 PROBLEM — Z12.5 PROSTATE CANCER SCREENING: Status: RESOLVED | Noted: 2025-03-05 | Resolved: 2025-04-04

## 2025-04-04 RX ORDER — ROSUVASTATIN CALCIUM 20 MG/1
20 TABLET, COATED ORAL DAILY
Qty: 90 TABLET | Refills: 3 | Status: SHIPPED | OUTPATIENT
Start: 2025-04-04

## 2025-07-31 ENCOUNTER — TELEPHONE (OUTPATIENT)
Age: 65
End: 2025-07-31

## 2025-08-02 ENCOUNTER — APPOINTMENT (OUTPATIENT)
Dept: RADIOLOGY | Facility: CLINIC | Age: 65
End: 2025-08-02
Attending: ORTHOPAEDIC SURGERY
Payer: COMMERCIAL

## 2025-08-02 VITALS — WEIGHT: 170 LBS | BODY MASS INDEX: 24.34 KG/M2 | HEIGHT: 70 IN

## 2025-08-02 DIAGNOSIS — Z01.89 ENCOUNTER FOR LOWER EXTREMITY COMPARISON IMAGING STUDY: ICD-10-CM

## 2025-08-02 DIAGNOSIS — G89.29 CHRONIC PAIN OF RIGHT KNEE: ICD-10-CM

## 2025-08-02 DIAGNOSIS — M77.11 LATERAL EPICONDYLITIS OF RIGHT ELBOW: ICD-10-CM

## 2025-08-02 DIAGNOSIS — M25.561 CHRONIC PAIN OF RIGHT KNEE: Primary | ICD-10-CM

## 2025-08-02 DIAGNOSIS — M25.561 CHRONIC PAIN OF RIGHT KNEE: ICD-10-CM

## 2025-08-02 DIAGNOSIS — G89.29 CHRONIC PAIN OF RIGHT KNEE: Primary | ICD-10-CM

## 2025-08-02 PROCEDURE — 73560 X-RAY EXAM OF KNEE 1 OR 2: CPT

## 2025-08-02 PROCEDURE — 99203 OFFICE O/P NEW LOW 30 MIN: CPT | Performed by: ORTHOPAEDIC SURGERY

## 2025-08-02 PROCEDURE — 73562 X-RAY EXAM OF KNEE 3: CPT
